# Patient Record
Sex: FEMALE | Race: WHITE | URBAN - METROPOLITAN AREA
[De-identification: names, ages, dates, MRNs, and addresses within clinical notes are randomized per-mention and may not be internally consistent; named-entity substitution may affect disease eponyms.]

---
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
## 2022-01-20 ENCOUNTER — TELEMEDICINE (OUTPATIENT)
Dept: FAMILY MEDICINE CLINIC | Facility: CLINIC | Age: 22
End: 2022-01-20

## 2022-01-20 ENCOUNTER — HOSPITAL ENCOUNTER (OUTPATIENT)
Dept: GENERAL RADIOLOGY | Age: 22
Discharge: HOME | End: 2022-01-20
Attending: NURSE PRACTITIONER
Payer: COMMERCIAL

## 2022-01-20 VITALS — BODY MASS INDEX: 22.15 KG/M2 | TEMPERATURE: 100 F | WEIGHT: 125 LBS | HEIGHT: 63 IN | OXYGEN SATURATION: 95 %

## 2022-01-20 DIAGNOSIS — R06.02 SHORTNESS OF BREATH: Primary | ICD-10-CM

## 2022-01-20 DIAGNOSIS — U07.1 COVID-19: ICD-10-CM

## 2022-01-20 DIAGNOSIS — R06.02 SHORTNESS OF BREATH: ICD-10-CM

## 2022-01-20 PROCEDURE — 99214 OFFICE O/P EST MOD 30 MIN: CPT | Performed by: NURSE PRACTITIONER

## 2022-01-20 PROCEDURE — 71046 X-RAY EXAM CHEST 2 VIEWS: CPT | Performed by: NURSE PRACTITIONER

## 2022-01-20 PROCEDURE — 3008F BODY MASS INDEX DOCD: CPT | Performed by: NURSE PRACTITIONER

## 2022-01-20 NOTE — PATIENT INSTRUCTIONS
Start Vitamin D  5000 units daily -        Rest, increase fluids, salt water gargles ,neti pot (use distilled water) or saline nasal spray, Mucinex-D 60/600(behind the counter), Tylenol/Ibuprofen, follow up if symptoms persist or increase.      Monitor oxyg

## 2022-01-20 NOTE — PROGRESS NOTES
CHIEF COMPLAINT:   Patient presents with:  Covid      HPI:   Nannette Robertson is a 25year old female who presents to clinic today video visit with complaints of short of breath for a week   Bf tested positive for covid weekend of Jan 8     Patient had covi palpation of maxillary sinuses  EYES: conjunctiva clear, no discharge  NOSE: nostrils patent, patient–congested  LYMPH: Per patient–no lymphadenopathy. THROAT: oral mucosa pink, moist. Posterior pharynx not erythematous or injected. No exudates.   LUNGS:

## 2022-08-03 ENCOUNTER — TELEMEDICINE (OUTPATIENT)
Dept: FAMILY MEDICINE CLINIC | Facility: CLINIC | Age: 22
End: 2022-08-03
Payer: COMMERCIAL

## 2022-08-03 DIAGNOSIS — Z30.09 ENCOUNTER FOR COUNSELING REGARDING CONTRACEPTION: Primary | ICD-10-CM

## 2022-08-03 DIAGNOSIS — Z11.3 ROUTINE SCREENING FOR STI (SEXUALLY TRANSMITTED INFECTION): ICD-10-CM

## 2022-08-03 PROBLEM — S82.101D CLOSED FRACTURE OF UPPER END OF RIGHT TIBIA WITH ROUTINE HEALING: Status: ACTIVE | Noted: 2021-12-22

## 2022-08-03 PROCEDURE — 99213 OFFICE O/P EST LOW 20 MIN: CPT | Performed by: NURSE PRACTITIONER

## 2022-08-03 RX ORDER — CLINDAMYCIN PHOSPHATE AND BENZOYL PEROXIDE 10; 50 MG/G; MG/G
GEL TOPICAL
COMMUNITY
Start: 2022-03-02

## 2022-08-03 RX ORDER — SPIRONOLACTONE 50 MG/1
50 TABLET, FILM COATED ORAL DAILY
COMMUNITY
Start: 2022-07-21

## 2022-08-03 RX ORDER — NORETHINDRONE ACETATE AND ETHINYL ESTRADIOL AND FERROUS FUMARATE 1MG-20(21)
KIT ORAL
COMMUNITY
Start: 2022-07-22

## 2022-08-03 NOTE — PATIENT INSTRUCTIONS
Stop birthcontrol for now. Finish current menstrual cycle, once next menstrual cycle starts restart new pill pack on Sunday after the beginning of menstrual cycle (ex.  Period starts on Monday, start birth control on Sunday)  Take every day, same time every day  Use condoms for pregnancy and STD prevention  Gonorrhea and chlamydia routine screening through [facility], first urine  Physical and PAP smear age 26

## 2022-09-09 ENCOUNTER — OFFICE VISIT (OUTPATIENT)
Dept: FAMILY MEDICINE CLINIC | Facility: CLINIC | Age: 22
End: 2022-09-09
Payer: COMMERCIAL

## 2022-09-09 VITALS
HEIGHT: 64.5 IN | SYSTOLIC BLOOD PRESSURE: 98 MMHG | HEART RATE: 98 BPM | WEIGHT: 123.81 LBS | TEMPERATURE: 98 F | BODY MASS INDEX: 20.88 KG/M2 | OXYGEN SATURATION: 100 % | RESPIRATION RATE: 16 BRPM | DIASTOLIC BLOOD PRESSURE: 72 MMHG

## 2022-09-09 DIAGNOSIS — J30.9 ALLERGIC RHINITIS, UNSPECIFIED SEASONALITY, UNSPECIFIED TRIGGER: ICD-10-CM

## 2022-09-09 DIAGNOSIS — R19.7 DIARRHEA, UNSPECIFIED TYPE: ICD-10-CM

## 2022-09-09 DIAGNOSIS — T78.40XD ALLERGY, SUBSEQUENT ENCOUNTER: Primary | ICD-10-CM

## 2022-09-09 PROCEDURE — 3008F BODY MASS INDEX DOCD: CPT | Performed by: NURSE PRACTITIONER

## 2022-09-09 PROCEDURE — 3074F SYST BP LT 130 MM HG: CPT | Performed by: NURSE PRACTITIONER

## 2022-09-09 PROCEDURE — 3078F DIAST BP <80 MM HG: CPT | Performed by: NURSE PRACTITIONER

## 2022-09-09 PROCEDURE — 99214 OFFICE O/P EST MOD 30 MIN: CPT | Performed by: NURSE PRACTITIONER

## 2022-09-09 RX ORDER — MONTELUKAST SODIUM 10 MG/1
10 TABLET ORAL DAILY
Qty: 90 TABLET | Refills: 3 | Status: SHIPPED | OUTPATIENT
Start: 2022-09-09 | End: 2023-09-04

## 2022-09-09 NOTE — PATIENT INSTRUCTIONS
Alavert over the counter for allergies     Singulair 10 mg each evening. Keep a food log     Follow up with GI  And allergist     It is important to get enough sleep (at least 7 hrs a night). Increase EXERCISE, eat a healthy diet (5 fruits and/or vegetables a day), stay hydrated,  take a multivitamin, take fish/krill oil capsules, learn something new, avoid excessive caffeine, avoid alcohol, cigarettes, and street drugs.

## 2023-02-27 ENCOUNTER — TELEPHONE (OUTPATIENT)
Dept: FAMILY MEDICINE CLINIC | Facility: CLINIC | Age: 23
End: 2023-02-27

## 2023-02-27 NOTE — TELEPHONE ENCOUNTER
Patient is due for a physical-is she able to get her physical done before the cruise?   Also-scopolamine patches are used more often than Zofran for motion sickness

## 2023-02-27 NOTE — TELEPHONE ENCOUNTER
Future Appointments   Date Time Provider Donna Mcdonald   3/1/2023 10:45 AM STEPHON Mejia [facility] [place]

## 2023-03-01 ENCOUNTER — OFFICE VISIT (OUTPATIENT)
Dept: FAMILY MEDICINE CLINIC | Facility: CLINIC | Age: 23
End: 2023-03-01
Payer: COMMERCIAL

## 2023-03-01 VITALS
BODY MASS INDEX: 20.03 KG/M2 | TEMPERATURE: 99 F | HEIGHT: 65 IN | HEART RATE: 106 BPM | OXYGEN SATURATION: 97 % | WEIGHT: 120.19 LBS | DIASTOLIC BLOOD PRESSURE: 79 MMHG | SYSTOLIC BLOOD PRESSURE: 118 MMHG | RESPIRATION RATE: 16 BRPM

## 2023-03-01 DIAGNOSIS — L70.0 ACNE VULGARIS: ICD-10-CM

## 2023-03-01 DIAGNOSIS — Z83.49 FAMILY HISTORY OF HASHIMOTO THYROIDITIS: ICD-10-CM

## 2023-03-01 DIAGNOSIS — J30.9 ALLERGIC RHINITIS, UNSPECIFIED SEASONALITY, UNSPECIFIED TRIGGER: ICD-10-CM

## 2023-03-01 DIAGNOSIS — T78.40XD ALLERGY, SUBSEQUENT ENCOUNTER: ICD-10-CM

## 2023-03-01 DIAGNOSIS — Z30.41 ENCOUNTER FOR BIRTH CONTROL PILLS MAINTENANCE: ICD-10-CM

## 2023-03-01 DIAGNOSIS — Z87.898 HX OF MOTION SICKNESS: ICD-10-CM

## 2023-03-01 DIAGNOSIS — Z00.00 ENCOUNTER FOR ANNUAL HEALTH EXAMINATION: Primary | ICD-10-CM

## 2023-03-01 DIAGNOSIS — R68.89 SENSATION OF FEELING COLD: ICD-10-CM

## 2023-03-01 PROCEDURE — 3078F DIAST BP <80 MM HG: CPT | Performed by: NURSE PRACTITIONER

## 2023-03-01 PROCEDURE — 87591 N.GONORRHOEAE DNA AMP PROB: CPT | Performed by: NURSE PRACTITIONER

## 2023-03-01 PROCEDURE — 99395 PREV VISIT EST AGE 18-39: CPT | Performed by: NURSE PRACTITIONER

## 2023-03-01 PROCEDURE — 87491 CHLMYD TRACH DNA AMP PROBE: CPT | Performed by: NURSE PRACTITIONER

## 2023-03-01 PROCEDURE — 3008F BODY MASS INDEX DOCD: CPT | Performed by: NURSE PRACTITIONER

## 2023-03-01 PROCEDURE — 3074F SYST BP LT 130 MM HG: CPT | Performed by: NURSE PRACTITIONER

## 2023-03-01 RX ORDER — EPINEPHRINE 0.3 MG/.3ML
INJECTION SUBCUTANEOUS
Qty: 2 EACH | Refills: 1 | Status: SHIPPED | OUTPATIENT
Start: 2023-03-01

## 2023-03-01 RX ORDER — MECLIZINE HCL 12.5 MG/1
12.5 TABLET ORAL 3 TIMES DAILY PRN
Qty: 30 TABLET | Refills: 0 | Status: SHIPPED | OUTPATIENT
Start: 2023-03-01

## 2023-03-01 NOTE — PATIENT INSTRUCTIONS
Fasting labs, get done at [facility] in the next few weeks    Restart allergy regimen  Epi pen refill, ensure taking on trip with you    Urine today    Meclizine if needed for cruise for motion sickness; side effects reviewed; stay hydrated    PAP after age 24    Physical in one year otherwise

## 2023-03-02 LAB
C TRACH DNA SPEC QL NAA+PROBE: NEGATIVE
N GONORRHOEA DNA SPEC QL NAA+PROBE: NEGATIVE

## 2023-03-06 ENCOUNTER — PATIENT MESSAGE (OUTPATIENT)
Dept: FAMILY MEDICINE CLINIC | Facility: CLINIC | Age: 23
End: 2023-03-06

## 2023-03-06 LAB
% SATURATION: 54 % (CALC) (ref 16–45)
ABSOLUTE BASOPHILS: 32 CELLS/UL (ref 0–200)
ABSOLUTE EOSINOPHILS: 132 CELLS/UL (ref 15–500)
ABSOLUTE LYMPHOCYTES: 1112 CELLS/UL (ref 850–3900)
ABSOLUTE MONOCYTES: 392 CELLS/UL (ref 200–950)
ABSOLUTE NEUTROPHILS: 2332 CELLS/UL (ref 1500–7800)
ALBUMIN/GLOBULIN RATIO: 1.7 (CALC) (ref 1–2.5)
ALBUMIN: 4.4 G/DL (ref 3.6–5.1)
ALKALINE PHOSPHATASE: 51 U/L (ref 31–125)
ALT: 9 U/L (ref 6–29)
AST: 15 U/L (ref 10–30)
BASOPHILS: 0.8 %
BILIRUBIN, TOTAL: 1.2 MG/DL (ref 0.2–1.2)
BUN: 10 MG/DL (ref 7–25)
CALCIUM: 9.5 MG/DL (ref 8.6–10.2)
CARBON DIOXIDE: 27 MMOL/L (ref 20–32)
CHLAMYDIA TRACHOMATIS$RNA, TMA: NOT DETECTED
CHLORIDE: 103 MMOL/L (ref 98–110)
CHOL/HDLC RATIO: 3 (CALC)
CHOLESTEROL, TOTAL: 180 MG/DL
CREATININE: 0.77 MG/DL (ref 0.5–0.96)
EGFR: 113 ML/MIN/1.73M2
EOSINOPHILS: 3.3 %
FERRITIN: 23 NG/ML (ref 16–154)
FOLATE, SERUM: 15.1 NG/ML
GLOBULIN: 2.6 G/DL (CALC) (ref 1.9–3.7)
GLUCOSE: 86 MG/DL (ref 65–99)
HDL CHOLESTEROL: 61 MG/DL
HEMATOCRIT: 41.6 % (ref 35–45)
HEMOGLOBIN: 14 G/DL (ref 11.7–15.5)
IRON BINDING CAPACITY: 383 MCG/DL (CALC) (ref 250–450)
IRON, TOTAL: 206 MCG/DL (ref 40–190)
LDL-CHOLESTEROL: 102 MG/DL (CALC)
LYMPHOCYTES: 27.8 %
MCH: 29.9 PG (ref 27–33)
MCHC: 33.7 G/DL (ref 32–36)
MCV: 88.7 FL (ref 80–100)
MONOCYTES: 9.8 %
MPV: 10.7 FL (ref 7.5–12.5)
NEISSERIA GONORRHOEAE$RNA, TMA: NOT DETECTED
NEUTROPHILS: 58.3 %
NON-HDL CHOLESTEROL: 119 MG/DL (CALC)
PLATELET COUNT: 251 THOUSAND/UL (ref 140–400)
POTASSIUM: 4.2 MMOL/L (ref 3.5–5.3)
PROTEIN, TOTAL: 7 G/DL (ref 6.1–8.1)
RDW: 11.9 % (ref 11–15)
RED BLOOD CELL COUNT: 4.69 MILLION/UL (ref 3.8–5.1)
SODIUM: 137 MMOL/L (ref 135–146)
THYROGLOBULIN ANTIBODIES: <1 IU/ML
THYROID PEROXIDASE$ANTIBODIES: 1 IU/ML
TRIGLYCERIDES: 81 MG/DL
TSH W/REFLEX TO FT4: 0.7 MIU/L
VITAMIN B12: 295 PG/ML (ref 200–1100)
WHITE BLOOD CELL COUNT: 4 THOUSAND/UL (ref 3.8–10.8)

## 2023-03-06 NOTE — TELEPHONE ENCOUNTER
From: Kareen Prader  To: STEPHON Bo  Sent: 3/6/2023 3:35 PM CST  Subject: Test results    Hey! Just curious what the outcomes of my test results are! Thank you in advance.

## 2023-03-08 PROBLEM — E53.8 LOW SERUM VITAMIN B12: Status: ACTIVE | Noted: 2023-03-08

## 2023-03-24 NOTE — TELEPHONE ENCOUNTER
Spoke with pt in regards to results and recommendations of lab results. Pt verbalizes understanding and states no further questions or concerns at this time.

## 2023-07-19 ENCOUNTER — TELEMEDICINE (OUTPATIENT)
Dept: FAMILY MEDICINE CLINIC | Facility: CLINIC | Age: 23
End: 2023-07-19
Payer: COMMERCIAL

## 2023-07-19 VITALS
HEIGHT: 64 IN | TEMPERATURE: 98 F | OXYGEN SATURATION: 98 % | HEART RATE: 104 BPM | BODY MASS INDEX: 20.49 KG/M2 | WEIGHT: 120 LBS

## 2023-07-19 DIAGNOSIS — R09.82 POST-NASAL DRIP: Primary | ICD-10-CM

## 2023-07-19 DIAGNOSIS — J30.9 ALLERGIC RHINITIS, UNSPECIFIED SEASONALITY, UNSPECIFIED TRIGGER: ICD-10-CM

## 2023-07-19 DIAGNOSIS — H93.8X3 SENSATION OF FULLNESS IN BOTH EARS: ICD-10-CM

## 2023-07-19 DIAGNOSIS — R09.81 SINUS CONGESTION: ICD-10-CM

## 2023-07-19 PROCEDURE — 99213 OFFICE O/P EST LOW 20 MIN: CPT

## 2023-07-19 RX ORDER — SWAB
1 SWAB, NON-MEDICATED MISCELLANEOUS DAILY
COMMUNITY

## 2023-07-19 RX ORDER — SENNOSIDES 8.6 MG
CAPSULE ORAL
COMMUNITY

## 2023-07-19 RX ORDER — DOXYCYCLINE 100 MG/1
100 CAPSULE ORAL 2 TIMES DAILY
Qty: 20 CAPSULE | Refills: 0 | COMMUNITY
Start: 2023-07-10 | End: 2023-07-20

## 2023-08-23 NOTE — TELEPHONE ENCOUNTER
ED Outreach Care Transitions Telephone Call Attempt     Discharge Date: 8/17/23  Discharge Location: [facility]: [facility] ER     Call Attempt Date: 8/23/2023  Call Attempt: Third  Left VM to CB  Unable to reach patient for [facility] [place] [place] ED Outreach Care Transitions program enrollment. 3 attempts to contact patient - case closed    Pt called back, informed. Pt states she was able to review her my chart as well.

## (undated) NOTE — LETTER
Date: 11/3/2021    Patient Name: Sha Ingram          To Whom it may concern: This letter has been written at the patient's request. The above patient was seen at the [facility] for treatment of a medical condition.     This patient shoul